# Patient Record
Sex: FEMALE | Race: BLACK OR AFRICAN AMERICAN | Employment: UNEMPLOYED | URBAN - METROPOLITAN AREA
[De-identification: names, ages, dates, MRNs, and addresses within clinical notes are randomized per-mention and may not be internally consistent; named-entity substitution may affect disease eponyms.]

---

## 2023-03-27 ENCOUNTER — TELEPHONE (OUTPATIENT)
Dept: NEUROLOGY | Facility: CLINIC | Age: 43
End: 2023-03-27

## 2023-03-27 NOTE — TELEPHONE ENCOUNTER
----- Message from Shanel Stuart sent at 3/27/2023 11:15 AM CDT -----  Contact: pt @ [phone]  Pilar Collins calling regarding Appointment Access  (message) for #pt is calling to get np appt for MS, asking for call back

## 2023-03-27 NOTE — TELEPHONE ENCOUNTER
I informed her that she should have office visit notes, MRI reports, and LP results (if available) faxed to this office. Fax number confirmed. I explained that we will call her to schedule once are received. Verbalizes understanding of all.

## 2023-06-16 ENCOUNTER — TELEPHONE (OUTPATIENT)
Dept: NEUROLOGY | Facility: CLINIC | Age: 43
End: 2023-06-16
Payer: COMMERCIAL

## 2023-06-16 NOTE — TELEPHONE ENCOUNTER
Called pt to schedule her for the next available NP appt with Dr. Reis. Pt said she would like to wait until August for the appt and asked if we were able to scheduled appts that far out. I informed pt I can schedule her in August, but Dr. Reis has sooner appts than that. Pt said she preferred to wait until August because she recently had her infusion and is doing well. Offered pt an appt with Dr. Reis on 8/14 at 12:00 PM and pt agreed. Will get Dom to schedule. I also told pt we would prefer them to bring in any outside MRI discs on the day of their appt. Pt verbalized understanding. Pt asked fi she was going to get a text reminder about her appt. I informed her once the appt is scheduled she will get a notification and she will get a text reminder a week before appt. Pt said she did not have the portal set up so I sent her a text activation code. Advised pt to download noel and get portal set up ASAP. Pt verbalized understanding.

## 2023-06-16 NOTE — TELEPHONE ENCOUNTER
Reviewed chart. Neurology notes and MRI reports are in media. Contacted pt. Pt stated she would like to see one of our providers. This Rn made her aware our  will reach out to her to schedule and appt. In the meantime pt stated she is taking Ocrevus. Advised pt to bring cd of MRI to appt. Pt voiced understanding.

## 2023-06-16 NOTE — TELEPHONE ENCOUNTER
----- Message from Ángela Gonzalez RN sent at 6/15/2023  5:18 PM CDT -----  Regarding: FW: Referral and records  Can you reach out to see if pt is still in need of a local neurologist?   ----- Message -----  From: Julisa Louie RN  Sent: 6/14/2023  10:40 AM CDT  To: Ángela Narvaez RN, #  Subject: RE: Referral and records                         Good morning,     Including MS team for scheduling    Julisa Glez  ----- Message -----  From: Krissy Piña  Sent: 6/13/2023  12:26 PM CDT  To: Julisa Louie RN  Subject: RE: Referral and records                         I apologize for any misunderstanding.  Perhaps my message was unclear.  I reviewed the chart notes from 03/27 which was asking for additional notes.  My message which was dated 04/04 was informing that the requested information had been received and scanned into media.  My message on 04/11 was to confirm what had happened since then, since 04/04/23.      Thank you,     Krissy Piña  ----- Message -----  From: Julisa Louie RN  Sent: 6/13/2023  11:23 AM CDT  To: Ángela Narvaez RN  Subject: RE: Referral and records                         Hi Merna Motta contacted pt 3/27 and documented as such.    Thanks,    Julisa  ----- Message -----  From: Krissy Piña  Sent: 4/11/2023   8:34 AM CDT  To: [facility], #  Subject: FW: Referral and records                         Good morning,    I am just following up on the message below so that I may close my case.  The message was coded as done but the patient is not scheduled and there is no chart note to confirm what had been done.  Please advise.      Thank you,    ----- Message -----  From: Krissy Piña  Sent: 4/4/2023  10:22 AM CDT  To: [facility]  Subject: Referral and records                             Good morning,     I received office notes on behalf of the patient above from [facility] with a diagnosis of MS.  I have  scanned the notes into media mgr.  Please review and contact the patient to schedule.     Thank you,    Krissy Piña  [facility]

## 2024-10-18 ENCOUNTER — TELEPHONE (OUTPATIENT)
Dept: NEUROLOGY | Facility: CLINIC | Age: 44
End: 2024-10-18

## 2024-10-18 NOTE — TELEPHONE ENCOUNTER
Spoke with pt to schedule NP appt soon. Pt stated she does to have insurance right now. Advised once she obtains her insurance again to give us a call back to schedule. Pr understood.